# Patient Record
Sex: FEMALE | Race: OTHER | NOT HISPANIC OR LATINO | URBAN - METROPOLITAN AREA
[De-identification: names, ages, dates, MRNs, and addresses within clinical notes are randomized per-mention and may not be internally consistent; named-entity substitution may affect disease eponyms.]

---

## 2021-11-10 ENCOUNTER — EMERGENCY (EMERGENCY)
Facility: HOSPITAL | Age: 31
LOS: 1 days | Discharge: HOME | End: 2021-11-10
Admitting: EMERGENCY MEDICINE
Payer: OTHER MISCELLANEOUS

## 2021-11-10 VITALS
TEMPERATURE: 98 F | OXYGEN SATURATION: 100 % | SYSTOLIC BLOOD PRESSURE: 122 MMHG | RESPIRATION RATE: 16 BRPM | DIASTOLIC BLOOD PRESSURE: 70 MMHG | HEART RATE: 75 BPM

## 2021-11-10 PROCEDURE — 99282 EMERGENCY DEPT VISIT SF MDM: CPT

## 2021-11-10 NOTE — ED PROVIDER NOTE - PATIENT PORTAL LINK FT
You can access the FollowMyHealth Patient Portal offered by [facility] by registering at the following website: [email or web address]. By joining [employer]’s FollowMyHealth portal, you will also be able to view your health information using other applications (apps) compatible with our system.

## 2021-11-10 NOTE — ED PROVIDER NOTE - NSFOLLOWUPINSTRUCTIONS_ED_ALL_ED_FT
Needlestick Injury    A needlestick injury happens when a person is stuck with a needle that may have someone else's blood on it. A needlestick injury may expose you to blood that carries infections.    What are the causes?  This injury is caused by mishandling a needle. It can happen when you are:    Giving an injection.  Drawing blood.  Performing medical procedures with a needle or scalpel.  Handling or throwing away used needles (sharps).    What increases the risk?  This injury is most likely to happen to health care providers who give medicine by injection, draw blood, give stitches (sutures), or handle used needles. It is more likely to happen to health care providers who:    Work shifts that are longer than 8 hours.  Do not have experience or proper training in handling needles.  Feel pressure or a sense of urgency.  Are very tired.    What are the signs or symptoms?  Symptoms of this injury include:    Pain or irritation at the injury site.  Bleeding at the injury site.    How is this diagnosed?  This injury is diagnosed based on how the injury happened and a physical exam. Your health care provider may check the medical history of the person whose blood your were exposed to and test that person's blood.    How is this treated?  This injury is treated by cleaning the injured area right away with soap and water or an alcohol-based solution. Treatment may also involve:    Getting a tetanus booster shot. This shot may be given if you have not had a tetanus booster shot within the past 10 years.  Getting a hepatitis B vaccination.  Getting blood tests. These may be recommended for up to 6 months after the injury to rule out infection.  Taking medicines to prevent possible infection.    Follow these instructions at home:  Take over-the-counter and prescription medicines only as told by your health care provider.  Keep all follow-up visits as told by your health care provider. This is important.  Do not share personal hygiene items, such as razors and toothbrushes.  Contact a health care provider if:  You feel anxious, angry, or depressed.  You have difficulty sleeping.  Your skin or the whites of your eyes look yellow (jaundice).  You have belly pain or a feeling of fullness.  You have fatigue.  You feel generally sick (malaise).  You have frequent infections.  Get help right away if:  You have redness, swelling, or pain at the injury site.  You have fluid or blood coming from the injury site.  You have pus or a bad smell coming from the injury site.  Your skin feels warm to the touch.  You have a fever.  This information is not intended to replace advice given to you by your health care provider. Make sure you discuss any questions you have with your health care provider.

## 2021-11-10 NOTE — ED PROVIDER NOTE - OBJECTIVE STATEMENT
30 y/o F denies pmh c/o needle stick to 3rd digit R hand. Pt reports she gave a Subq injection and while putting the needle in the sharps container she did not realize the needle was bent and it stuck her finger. Known source pt MRN, pt notified supervisor. Has no other complaints.

## 2021-12-13 ENCOUNTER — EMERGENCY (EMERGENCY)
Facility: HOSPITAL | Age: 31
LOS: 1 days | Discharge: HOME | End: 2021-12-13
Attending: EMERGENCY MEDICINE | Admitting: EMERGENCY MEDICINE
Payer: OTHER MISCELLANEOUS

## 2021-12-13 VITALS
SYSTOLIC BLOOD PRESSURE: 106 MMHG | HEART RATE: 67 BPM | TEMPERATURE: 98 F | RESPIRATION RATE: 15 BRPM | DIASTOLIC BLOOD PRESSURE: 50 MMHG | OXYGEN SATURATION: 100 %

## 2021-12-13 PROCEDURE — 99053 MED SERV 10PM-8AM 24 HR FAC: CPT

## 2021-12-13 PROCEDURE — 99282 EMERGENCY DEPT VISIT SF MDM: CPT

## 2021-12-13 NOTE — ED PROVIDER NOTE - PATIENT PORTAL LINK FT
You can access the FollowMyHealth Patient Portal offered by [facility] by registering at the following website: [email or web address]. By joining [employer]’s FollowMyHealth portal, you will also be able to view your health information using other applications (apps) compatible with our system.

## 2021-12-13 NOTE — ED PROVIDER NOTE - OBJECTIVE STATEMENT
31 year old female came to the ED for her 1 month follow up. after a needle stick with a small bore needle. No PEP, no complaints, no fever, no chills, no vomiting, no diarrhea, no rash.

## 2021-12-13 NOTE — ED PROVIDER NOTE - CHIEF COMPLAINT
The patient is a 31y Female complaining of  The patient is a 31y Female complaining of finger stick follow up.

## 2021-12-13 NOTE — ED ADULT TRIAGE NOTE - CHIEF COMPLAINT QUOTE
alert no distress here for follow up for needle stick happened 1 month ago no PMHx   has no complaints
